# Patient Record
Sex: MALE | Race: OTHER
[De-identification: names, ages, dates, MRNs, and addresses within clinical notes are randomized per-mention and may not be internally consistent; named-entity substitution may affect disease eponyms.]

---

## 2018-01-03 ENCOUNTER — HOSPITAL ENCOUNTER (INPATIENT)
Dept: HOSPITAL 54 - ER | Age: 83
LOS: 6 days | DRG: 871 | End: 2018-01-09
Attending: INTERNAL MEDICINE | Admitting: INTERNAL MEDICINE
Payer: COMMERCIAL

## 2018-01-03 VITALS — DIASTOLIC BLOOD PRESSURE: 109 MMHG | SYSTOLIC BLOOD PRESSURE: 158 MMHG

## 2018-01-03 VITALS — BODY MASS INDEX: 28.63 KG/M2 | WEIGHT: 200 LBS | HEIGHT: 70 IN

## 2018-01-03 DIAGNOSIS — J12.9: ICD-10-CM

## 2018-01-03 DIAGNOSIS — B96.89: ICD-10-CM

## 2018-01-03 DIAGNOSIS — J18.9: ICD-10-CM

## 2018-01-03 DIAGNOSIS — I50.33: ICD-10-CM

## 2018-01-03 DIAGNOSIS — D72.819: ICD-10-CM

## 2018-01-03 DIAGNOSIS — E22.2: ICD-10-CM

## 2018-01-03 DIAGNOSIS — I48.91: ICD-10-CM

## 2018-01-03 DIAGNOSIS — I25.2: ICD-10-CM

## 2018-01-03 DIAGNOSIS — A41.9: Primary | ICD-10-CM

## 2018-01-03 DIAGNOSIS — Z51.5: ICD-10-CM

## 2018-01-03 DIAGNOSIS — R00.1: ICD-10-CM

## 2018-01-03 DIAGNOSIS — I11.0: ICD-10-CM

## 2018-01-03 DIAGNOSIS — R65.20: ICD-10-CM

## 2018-01-03 DIAGNOSIS — I25.10: ICD-10-CM

## 2018-01-03 DIAGNOSIS — J96.01: ICD-10-CM

## 2018-01-03 DIAGNOSIS — Z66: ICD-10-CM

## 2018-01-03 DIAGNOSIS — I21.4: ICD-10-CM

## 2018-01-03 DIAGNOSIS — Z95.5: ICD-10-CM

## 2018-01-03 DIAGNOSIS — Z79.01: ICD-10-CM

## 2018-01-03 LAB
APTT PPP: 30 SEC (ref 23–34)
BASOPHILS # BLD AUTO: 0.2 /CMM (ref 0–0.2)
BASOPHILS NFR BLD AUTO: 2.4 % (ref 0–2)
BUN SERPL-MCNC: 11 MG/DL (ref 7–18)
CALCIUM SERPL-MCNC: 8.7 MG/DL (ref 8.5–10.1)
CHLORIDE SERPL-SCNC: 95 MMOL/L (ref 98–107)
CO2 SERPL-SCNC: 26 MMOL/L (ref 21–32)
CREAT SERPL-MCNC: 0.9 MG/DL (ref 0.6–1.3)
EOSINOPHIL # BLD AUTO: 0.1 /CMM (ref 0–0.7)
EOSINOPHIL NFR BLD AUTO: 1.6 % (ref 0–6)
GLUCOSE SERPL-MCNC: 115 MG/DL (ref 74–106)
HCT VFR BLD AUTO: 42 % (ref 39–51)
HGB BLD-MCNC: 14.3 G/DL (ref 13.5–17.5)
INR PPP: 1.34 (ref 0.87–1.13)
LYMPHOCYTES NFR BLD AUTO: 1.2 /CMM (ref 0.8–4.8)
LYMPHOCYTES NFR BLD AUTO: 18.9 % (ref 20–44)
MCH RBC QN AUTO: 35 PG (ref 26–33)
MCHC RBC AUTO-ENTMCNC: 34 G/DL (ref 31–36)
MCV RBC AUTO: 102 FL (ref 80–96)
MONOCYTES NFR BLD AUTO: 0.5 /CMM (ref 0.1–1.3)
MONOCYTES NFR BLD AUTO: 7.2 % (ref 2–12)
NEUTROPHILS # BLD AUTO: 4.5 /CMM (ref 1.8–8.9)
NEUTROPHILS NFR BLD AUTO: 69.9 % (ref 43–81)
NT-PROBNP SERPL-MCNC: 2751 PG/ML (ref 0–125)
PLATELET # BLD AUTO: 130 /CMM (ref 150–450)
POTASSIUM SERPL-SCNC: 4.7 MMOL/L (ref 3.5–5.1)
RBC # BLD AUTO: 4.07 MIL/UL (ref 4.5–6)
RDW COEFFICIENT OF VARIATION: 13.4 (ref 11.5–15)
SODIUM SERPL-SCNC: 127 MMOL/L (ref 136–145)
TROPONIN I SERPL-MCNC: 0.06 NG/ML (ref 0–0.06)
WBC NRBC COR # BLD AUTO: 6.5 K/UL (ref 4.3–11)

## 2018-01-03 PROCEDURE — A4606 OXYGEN PROBE USED W OXIMETER: HCPCS

## 2018-01-03 PROCEDURE — Z7610: HCPCS

## 2018-01-03 RX ADMIN — NITROGLYCERIN SCH GM: 20 OINTMENT TOPICAL at 23:49

## 2018-01-03 RX ADMIN — FUROSEMIDE SCH MG: 10 INJECTION, SOLUTION INTRAMUSCULAR; INTRAVENOUS at 22:43

## 2018-01-03 RX ADMIN — METOPROLOL TARTRATE SCH MG: 25 TABLET, FILM COATED ORAL at 23:49

## 2018-01-03 NOTE — NUR
[id number]: SOB ON EXERTION.. PATIENT IS AWAKE AND ALERT. APPEARS IN NO DISTRESS. 
SKIN IS WARM TO TOUCH AND NON DIAPHORETIC. AFEBRILE. VSS

## 2018-01-03 NOTE — NUR
RN OPENING NOTES

RECEIVED PATIENT FROM ER IN STABLE CONDITION, ALERT AND ORIENTED X2-3, ENGLISH/Setswana 
SPEAKING. FAMILY PRESENT AT BEDSIDE. NO C/O PAIN AT THIS TIME. TELE MONITOR IS IN PLACE, 
AFIB 58 BPM. PATIENT C/O OF COUGH AND SOB. RESPIRATIONS EVEN AND UNLABORED. BSX4. IV ACCESS 
ON LEFT AC PATENT AND INTACT, FLUSHING WELL WITH NS, NO REDNESS OR INFILTRATION NOTED. 
WAITING FOR MD ORDERS. BED IN LOW AND LOCKED POSITION, SIDE RAILSX2. CALL LIGHT WITHIN EASY 
REACH. WILL CONTINUE TO MONITOR AND ASSESS DURING THE SHIFT.

## 2018-01-03 NOTE — NUR
-------------------------------------------------------------------------------

           *** Note undone in EDM - 01/03/18 at 1914 by DILIA ***            

-------------------------------------------------------------------------------

PT IN BED APPEARS COMFORTABLE. CURRENTLY ON BREATHING TX.  AT BEDSIDE

## 2018-01-04 VITALS — DIASTOLIC BLOOD PRESSURE: 67 MMHG | SYSTOLIC BLOOD PRESSURE: 125 MMHG

## 2018-01-04 VITALS — DIASTOLIC BLOOD PRESSURE: 93 MMHG | SYSTOLIC BLOOD PRESSURE: 136 MMHG

## 2018-01-04 VITALS — SYSTOLIC BLOOD PRESSURE: 146 MMHG | DIASTOLIC BLOOD PRESSURE: 90 MMHG

## 2018-01-04 VITALS — SYSTOLIC BLOOD PRESSURE: 144 MMHG | DIASTOLIC BLOOD PRESSURE: 74 MMHG

## 2018-01-04 VITALS — DIASTOLIC BLOOD PRESSURE: 66 MMHG | SYSTOLIC BLOOD PRESSURE: 126 MMHG

## 2018-01-04 VITALS — SYSTOLIC BLOOD PRESSURE: 136 MMHG | DIASTOLIC BLOOD PRESSURE: 80 MMHG

## 2018-01-04 LAB
BASOPHILS # BLD AUTO: 0 /CMM (ref 0–0.2)
BASOPHILS NFR BLD AUTO: 0.2 % (ref 0–2)
BUN SERPL-MCNC: 14 MG/DL (ref 7–18)
CALCIUM SERPL-MCNC: 8.4 MG/DL (ref 8.5–10.1)
CHLORIDE SERPL-SCNC: 94 MMOL/L (ref 98–107)
CHOLEST SERPL-MCNC: 132 MG/DL (ref ?–200)
CO2 SERPL-SCNC: 25 MMOL/L (ref 21–32)
CREAT SERPL-MCNC: 1 MG/DL (ref 0.6–1.3)
EOSINOPHIL # BLD AUTO: 0 /CMM (ref 0–0.7)
EOSINOPHIL NFR BLD AUTO: 0.8 % (ref 0–6)
EOSINOPHIL NFR BLD MANUAL: 2 % (ref 0–4)
GLUCOSE SERPL-MCNC: 120 MG/DL (ref 74–106)
HCT VFR BLD AUTO: 38 % (ref 39–51)
HDLC SERPL-MCNC: 66 MG/DL (ref 40–60)
HGB BLD-MCNC: 13.1 G/DL (ref 13.5–17.5)
INR PPP: 1.38 (ref 0.87–1.13)
LDLC SERPL DIRECT ASSAY-MCNC: 71 MG/DL (ref 0–99)
LYMPHOCYTES NFR BLD AUTO: 0.9 /CMM (ref 0.8–4.8)
LYMPHOCYTES NFR BLD AUTO: 16.3 % (ref 20–44)
LYMPHOCYTES NFR BLD MANUAL: 12 % (ref 16–48)
MAGNESIUM SERPL-MCNC: 1.6 MG/DL (ref 1.8–2.4)
MCH RBC QN AUTO: 35 PG (ref 26–33)
MCHC RBC AUTO-ENTMCNC: 34 G/DL (ref 31–36)
MCV RBC AUTO: 102 FL (ref 80–96)
MONOCYTES NFR BLD AUTO: 0.4 /CMM (ref 0.1–1.3)
MONOCYTES NFR BLD AUTO: 7.3 % (ref 2–12)
MONOCYTES NFR BLD MANUAL: 4 % (ref 0–11)
NEUTROPHILS # BLD AUTO: 4.1 /CMM (ref 1.8–8.9)
NEUTROPHILS NFR BLD AUTO: 75.4 % (ref 43–81)
NEUTS BAND NFR BLD MANUAL: 1 % (ref 0–5)
NEUTS SEG NFR BLD MANUAL: 81 % (ref 42–76)
PHOSPHATE SERPL-MCNC: 3.1 MG/DL (ref 2.5–4.9)
PLATELET # BLD AUTO: 89 /CMM (ref 150–450)
POTASSIUM SERPL-SCNC: 3.9 MMOL/L (ref 3.5–5.1)
RBC # BLD AUTO: 3.74 MIL/UL (ref 4.5–6)
RDW COEFFICIENT OF VARIATION: 13.6 (ref 11.5–15)
SODIUM SERPL-SCNC: 126 MMOL/L (ref 136–145)
TRIGL SERPL-MCNC: 42 MG/DL (ref 30–150)
WBC NRBC COR # BLD AUTO: 5.4 K/UL (ref 4.3–11)

## 2018-01-04 RX ADMIN — ZOLPIDEM TARTRATE PRN MG: 5 TABLET, FILM COATED ORAL at 22:18

## 2018-01-04 RX ADMIN — METOPROLOL TARTRATE SCH MG: 25 TABLET, FILM COATED ORAL at 08:50

## 2018-01-04 RX ADMIN — FUROSEMIDE SCH MG: 10 INJECTION, SOLUTION INTRAMUSCULAR; INTRAVENOUS at 16:38

## 2018-01-04 RX ADMIN — MAGNESIUM SULFATE IN DEXTROSE SCH MLS/HR: 10 INJECTION, SOLUTION INTRAVENOUS at 11:44

## 2018-01-04 RX ADMIN — WARFARIN SODIUM SCH MG: 5 TABLET ORAL at 17:33

## 2018-01-04 RX ADMIN — FUROSEMIDE SCH MG: 10 INJECTION, SOLUTION INTRAMUSCULAR; INTRAVENOUS at 08:50

## 2018-01-04 RX ADMIN — Medication SCH MG: at 08:51

## 2018-01-04 RX ADMIN — MAGNESIUM SULFATE IN DEXTROSE SCH MLS/HR: 10 INJECTION, SOLUTION INTRAVENOUS at 10:29

## 2018-01-04 RX ADMIN — NITROGLYCERIN SCH GM: 20 OINTMENT TOPICAL at 21:09

## 2018-01-04 RX ADMIN — NITROGLYCERIN SCH GM: 20 OINTMENT TOPICAL at 07:33

## 2018-01-04 RX ADMIN — Medication SCH MG: at 16:38

## 2018-01-04 RX ADMIN — NITROGLYCERIN SCH GM: 20 OINTMENT TOPICAL at 15:19

## 2018-01-04 RX ADMIN — PANTOPRAZOLE SODIUM SCH MG: 40 TABLET, DELAYED RELEASE ORAL at 08:50

## 2018-01-04 NOTE — NUR
TELE RN CLOSING NOTES



PATIENT IN BED, A/O X3. ON TELE MONITOR AFIB HR 59. MAGNESIUM SUPPLEMENTED TODAY. PATIENT IS 
AMBULATORY, FORGETFUL, FREQUENT REMINDERS GIVEN. TOLERATING ROOM AIR, NO SOB. BREATHING EVEN 
AND NON LABORED. CONT HOSPITALIZATION. WILL ENDORSE TO NIGHT SHIFT RN FOR MARTA.

## 2018-01-04 NOTE — NUR
AFIB HR DECREASING FROM 62 TO 38, PATIENT DENIES ANY DISCOMFORT, FAMILY AT THE BEDSIDE. 
INFORMED DR. FIELDS, ORDERED EKG STAT NOTED AND ACKNOWLEDGED.

## 2018-01-04 NOTE — NUR
TELE RN NOTES



PATIENT IN BED, SLEEPING, AROUSES EASILY. AFIB HR 62 ON THE MONITOR, ON 2L OXYGEN VIA NC, 
BREATHING EVEN AND NON LABORED, NO SOB. IVC IN LEFT AC G20 PATENT AND INTACT, FLUSHES WELL. 
BED ALARM ON, SIDE RAILS UP X2. CALL LIGHT WITHIN REACH. WILL CONT TO MONITOR.

## 2018-01-04 NOTE — NUR
PATIENT IS SEEN BY DR. SANCHEZ/CARDIO, MD REVIEWED EKG RESULT AND PATIENTS HOME MEDICATION, 
SPOKE TO THE PATIENT AND FAMILY. WILL CONT TO MONITOR PATIENT.

## 2018-01-04 NOTE — NUR
RN CLOSING NOTES

PATIENT IS SLEEPING IN BED, EASY TO AROUSE, ALERT AND ORIENTED X2-3. NO C/O PAIN AT THIS 
TIME. TELE MONITOR IS IN PLACE, AFIB 67 BPM. RESPIRATIONS EVEN AND UNLABORED.  IV ACCESS ON 
LEFT AC PATENT AND INTACT, FLUSHING WELL WITH NS, NO REDNESS OR INFILTRATION NOTED.ALL NEEDS 
ARE MET AND MEDICATIONS GIVEN PER MD ORDER. BED IN LOW AND LOCKED POSITION, SIDE RAILSX2. 
CALL LIGHT WITHIN EASY REACH. WILL ENDORSE TO RN DAY SHIFT FOR [facility].

## 2018-01-05 VITALS — SYSTOLIC BLOOD PRESSURE: 121 MMHG | DIASTOLIC BLOOD PRESSURE: 70 MMHG

## 2018-01-05 VITALS — SYSTOLIC BLOOD PRESSURE: 145 MMHG | DIASTOLIC BLOOD PRESSURE: 82 MMHG

## 2018-01-05 VITALS — DIASTOLIC BLOOD PRESSURE: 92 MMHG | SYSTOLIC BLOOD PRESSURE: 151 MMHG

## 2018-01-05 VITALS — SYSTOLIC BLOOD PRESSURE: 132 MMHG | DIASTOLIC BLOOD PRESSURE: 75 MMHG

## 2018-01-05 VITALS — SYSTOLIC BLOOD PRESSURE: 178 MMHG | DIASTOLIC BLOOD PRESSURE: 90 MMHG

## 2018-01-05 VITALS — DIASTOLIC BLOOD PRESSURE: 66 MMHG | SYSTOLIC BLOOD PRESSURE: 136 MMHG

## 2018-01-05 VITALS — DIASTOLIC BLOOD PRESSURE: 72 MMHG | SYSTOLIC BLOOD PRESSURE: 139 MMHG

## 2018-01-05 LAB
BASOPHILS # BLD AUTO: 0 /CMM (ref 0–0.2)
BASOPHILS NFR BLD AUTO: 0.5 % (ref 0–2)
BUN SERPL-MCNC: 19 MG/DL (ref 7–18)
CALCIUM SERPL-MCNC: 7.9 MG/DL (ref 8.5–10.1)
CHLORIDE SERPL-SCNC: 91 MMOL/L (ref 98–107)
CO2 SERPL-SCNC: 22 MMOL/L (ref 21–32)
CREAT SERPL-MCNC: 0.9 MG/DL (ref 0.6–1.3)
EOSINOPHIL # BLD AUTO: 0.1 /CMM (ref 0–0.7)
EOSINOPHIL NFR BLD AUTO: 2.1 % (ref 0–6)
EOSINOPHIL NFR BLD MANUAL: 2 % (ref 0–4)
GLUCOSE SERPL-MCNC: 93 MG/DL (ref 74–106)
HCT VFR BLD AUTO: 35 % (ref 39–51)
HGB BLD-MCNC: 12.4 G/DL (ref 13.5–17.5)
INR PPP: 1.6 (ref 0.87–1.13)
LYMPHOCYTES NFR BLD AUTO: 1.1 /CMM (ref 0.8–4.8)
LYMPHOCYTES NFR BLD AUTO: 24.3 % (ref 20–44)
LYMPHOCYTES NFR BLD MANUAL: 19 % (ref 16–48)
MAGNESIUM SERPL-MCNC: 1.7 MG/DL (ref 1.8–2.4)
MCH RBC QN AUTO: 37 PG (ref 26–33)
MCHC RBC AUTO-ENTMCNC: 35 G/DL (ref 31–36)
MCV RBC AUTO: 104 FL (ref 80–96)
MONOCYTES NFR BLD AUTO: 0.4 /CMM (ref 0.1–1.3)
MONOCYTES NFR BLD AUTO: 8.7 % (ref 2–12)
MONOCYTES NFR BLD MANUAL: 11 % (ref 0–11)
NEUTROPHILS # BLD AUTO: 3 /CMM (ref 1.8–8.9)
NEUTROPHILS NFR BLD AUTO: 64.4 % (ref 43–81)
NEUTS SEG NFR BLD MANUAL: 66 % (ref 42–76)
PLATELET # BLD AUTO: 81 /CMM (ref 150–450)
POTASSIUM SERPL-SCNC: 3.7 MMOL/L (ref 3.5–5.1)
RBC # BLD AUTO: 3.39 MIL/UL (ref 4.5–6)
RDW COEFFICIENT OF VARIATION: 13.6 (ref 11.5–15)
SODIUM SERPL-SCNC: 123 MMOL/L (ref 136–145)
VARIANT LYMPHS NFR BLD MANUAL: 2 % (ref 0–0)
WBC NRBC COR # BLD AUTO: 4.6 K/UL (ref 4.3–11)

## 2018-01-05 RX ADMIN — PANTOPRAZOLE SODIUM SCH MG: 40 TABLET, DELAYED RELEASE ORAL at 08:18

## 2018-01-05 RX ADMIN — ZOLPIDEM TARTRATE PRN MG: 5 TABLET, FILM COATED ORAL at 20:52

## 2018-01-05 RX ADMIN — NITROGLYCERIN SCH GM: 20 OINTMENT TOPICAL at 05:44

## 2018-01-05 RX ADMIN — Medication PRN MG: at 20:37

## 2018-01-05 RX ADMIN — MAGNESIUM SULFATE IN DEXTROSE SCH MLS/HR: 10 INJECTION, SOLUTION INTRAVENOUS at 11:49

## 2018-01-05 RX ADMIN — MAGNESIUM SULFATE IN DEXTROSE SCH MLS/HR: 10 INJECTION, SOLUTION INTRAVENOUS at 10:43

## 2018-01-05 RX ADMIN — ALBUTEROL SULFATE PRN MG: 2.5 SOLUTION RESPIRATORY (INHALATION) at 20:37

## 2018-01-05 RX ADMIN — NITROGLYCERIN SCH GM: 20 OINTMENT TOPICAL at 12:20

## 2018-01-05 RX ADMIN — Medication SCH MG: at 08:18

## 2018-01-05 RX ADMIN — NITROGLYCERIN SCH GM: 20 OINTMENT TOPICAL at 20:51

## 2018-01-05 RX ADMIN — FUROSEMIDE SCH MG: 10 INJECTION, SOLUTION INTRAMUSCULAR; INTRAVENOUS at 08:18

## 2018-01-05 RX ADMIN — Medication SCH MG: at 17:14

## 2018-01-05 RX ADMIN — WARFARIN SODIUM SCH MG: 5 TABLET ORAL at 17:15

## 2018-01-05 RX ADMIN — FUROSEMIDE SCH MG: 10 INJECTION, SOLUTION INTRAMUSCULAR; INTRAVENOUS at 17:14

## 2018-01-05 NOTE — NUR
TELE RN: INITIAL NOTE

RECEIVED PT A/OX2-3. ON TELE MONITORING. PACING A.FIB IN 62BPM. NO DISTRESS NOTED. NO SOB 
NOTED. NO PAIN NOTED. CONTINENT AND BRP. AMBULATORY WITH ASSIST. SKIN INTACT. HL ON L AC#20. 
SITE CLEAR AND PATENT. NO IV FLUIDS RUNNING. RESTING COMFORTABLY IN BED. CALL LIGHT WITHIN 
REACH.

## 2018-01-05 NOTE — NUR
TELE RN NOTES



AWAKE & RESPONSIVE. NOT IN ANY DISTRESS. NO SOB NOTED. DENIES ANY PAIN OR DISCOMFORT AT THIS 
TIME. PT REFUSING TELE MONITOR. WITH IV-HL PATENT & INTACT. MONITORED ACCORDINGLY. CALL 
LIGHT WITHIN REACH. BED IN LOWEST POSITION. SR UP X 2 WITH BED ALARM ON FOR SAFETY. WILL 
ENDORSE TO NEXT SHIFT.

## 2018-01-05 NOTE — NUR
TELE RN: CLOSING NOTE

PT TOOK ALL MEDICATIONS ON TIME. NO ADVERSE REACTIONS NOTED. NO SOB NOTED. NO PAIN NOTED. ON 
TELE MONITORING A.FIB AT 66 BPM. AMBULATORY. SKIN INTACT. HL #20 ON L AC WITH NO IV FLUIDS 
RUNNING. SITE CLEAR AND PATENT. RESTING COMFORTABLY IN BED. CALL LIGHT WITHIN REACH.

## 2018-01-06 VITALS — DIASTOLIC BLOOD PRESSURE: 77 MMHG | SYSTOLIC BLOOD PRESSURE: 156 MMHG

## 2018-01-06 VITALS — SYSTOLIC BLOOD PRESSURE: 104 MMHG | DIASTOLIC BLOOD PRESSURE: 62 MMHG

## 2018-01-06 VITALS — SYSTOLIC BLOOD PRESSURE: 141 MMHG | DIASTOLIC BLOOD PRESSURE: 74 MMHG

## 2018-01-06 VITALS — DIASTOLIC BLOOD PRESSURE: 70 MMHG | SYSTOLIC BLOOD PRESSURE: 128 MMHG

## 2018-01-06 VITALS — DIASTOLIC BLOOD PRESSURE: 115 MMHG | SYSTOLIC BLOOD PRESSURE: 116 MMHG

## 2018-01-06 LAB
BASOPHILS # BLD AUTO: 0 /CMM (ref 0–0.2)
BASOPHILS NFR BLD AUTO: 0 % (ref 0–2)
BUN SERPL-MCNC: 18 MG/DL (ref 7–18)
CALCIUM SERPL-MCNC: 8 MG/DL (ref 8.5–10.1)
CHLORIDE SERPL-SCNC: 86 MMOL/L (ref 98–107)
CO2 SERPL-SCNC: 27 MMOL/L (ref 21–32)
CREAT SERPL-MCNC: 1.1 MG/DL (ref 0.6–1.3)
EOSINOPHIL # BLD AUTO: 0 /CMM (ref 0–0.7)
EOSINOPHIL NFR BLD AUTO: 0 % (ref 0–6)
GLUCOSE SERPL-MCNC: 98 MG/DL (ref 74–106)
HCT VFR BLD AUTO: 36 % (ref 39–51)
HGB BLD-MCNC: 12.6 G/DL (ref 13.5–17.5)
INR PPP: 2.86 (ref 0.87–1.13)
LYMPHOCYTES NFR BLD AUTO: 0.2 /CMM (ref 0.8–4.8)
LYMPHOCYTES NFR BLD AUTO: 4.9 % (ref 20–44)
LYMPHOCYTES NFR BLD MANUAL: 6 % (ref 16–48)
MCH RBC QN AUTO: 36 PG (ref 26–33)
MCHC RBC AUTO-ENTMCNC: 35 G/DL (ref 31–36)
MCV RBC AUTO: 104 FL (ref 80–96)
MONOCYTES NFR BLD AUTO: 0.1 /CMM (ref 0.1–1.3)
MONOCYTES NFR BLD AUTO: 2.1 % (ref 2–12)
MONOCYTES NFR BLD MANUAL: 3 % (ref 0–11)
NEUTROPHILS # BLD AUTO: 3.4 /CMM (ref 1.8–8.9)
NEUTROPHILS NFR BLD AUTO: 93 % (ref 43–81)
NEUTS BAND NFR BLD MANUAL: 18 % (ref 0–5)
NEUTS SEG NFR BLD MANUAL: 73 % (ref 42–76)
PLATELET # BLD AUTO: 96 /CMM (ref 150–450)
POTASSIUM SERPL-SCNC: 3.3 MMOL/L (ref 3.5–5.1)
RBC # BLD AUTO: 3.5 MIL/UL (ref 4.5–6)
RDW COEFFICIENT OF VARIATION: 13.6 (ref 11.5–15)
SODIUM SERPL-SCNC: 122 MMOL/L (ref 136–145)
WBC NRBC COR # BLD AUTO: 3.7 K/UL (ref 4.3–11)

## 2018-01-06 RX ADMIN — ALBUTEROL SULFATE PRN MG: 2.5 SOLUTION RESPIRATORY (INHALATION) at 11:28

## 2018-01-06 RX ADMIN — Medication PRN MG: at 11:28

## 2018-01-06 RX ADMIN — Medication SCH MG: at 16:36

## 2018-01-06 RX ADMIN — FUROSEMIDE SCH MG: 10 INJECTION, SOLUTION INTRAMUSCULAR; INTRAVENOUS at 08:52

## 2018-01-06 RX ADMIN — NITROGLYCERIN SCH GM: 20 OINTMENT TOPICAL at 21:35

## 2018-01-06 RX ADMIN — Medication SCH MG: at 08:51

## 2018-01-06 RX ADMIN — PANTOPRAZOLE SODIUM SCH MG: 40 TABLET, DELAYED RELEASE ORAL at 08:51

## 2018-01-06 RX ADMIN — NITROGLYCERIN SCH GM: 20 OINTMENT TOPICAL at 05:39

## 2018-01-06 RX ADMIN — NITROGLYCERIN SCH GM: 20 OINTMENT TOPICAL at 12:07

## 2018-01-06 NOTE — NUR
MS/RN OPENING NOTES

PATIENT IN BED, RESTING COMFORTABLY IN BED, SPOUSE WAS AT BED SIDE AND REPORTED REGARING 
DISCOLORATION OF ABDOMEN ,, AM RN WAS MADE AWARE ASWELL, AND PHOTO WAS TAKEN, WILL MONITOR, 
PER WIFE NOT TO GIVE ANY MEDICATION BY MOUTH AND MONITOR AS PATIENT ATTEMPTS TO GET OUT OF 
BED FOR SAFETY, ALERTX2, ABLE TO FOLLOW SIMPLE COMMANDS, SKIN WARM TO TOUCH, BED ALARM ON 
WILL CONTINUE TO MONITOR.

## 2018-01-06 NOTE — NUR
MS/RN NOTES



RECEIVED PATIENT RESTING IN BED, SLEEPING AROUSES TO VERBAL AND TACTILE STIMULI. IN NO 
APPARENT DISTRESS, NO S/S OF PAIN OR DISCOMFORT , APPEARS CALM AND COMFORTABLE. RESPIRATIONS 
EVEN AND UNLABORED, ON 02 VIA NC AT 2LPM SAT ABOVE 95%. IV TO LAC G20 H/L. PATIENT AT 
INCREASED RISK OF FALL/INJURY, SAFETY MEASURES RENDERED , BED LOCKED IN LOWEST POSITION, 
RAILS UPX2, CALL LIGHT PLACED WITHIN EASY REACH. WILL CONTINUE TO MONITOR.

## 2018-01-06 NOTE — NUR
DURGA/RN NOTES



PATIENT IS NOTED WITH PURPLE DISCOLORATION, POSSIBLY HEMATOMA TO THE ABDOMEN. PER WIFE 
PATIENT DID NOT HAVE THE DISCOLORATION UPON ADMISSION. PICTURES TAKEN AND PLACED IN CHART. 
WITNESS DURING END OF SHIFT. .

## 2018-01-06 NOTE — NUR
MS/RN NOTES



PATIENT RESTING IN BED COMFORTABLY WITH FAMILY AT BEDSIDE. NO S.S OF PAIN OR DISCOMFORT 
NOTED. PATIENT STABLE WITH NO SIGNIFICANT CHANGES. ALL DUE MEDICATIONS GIVEN DUE NEEDS MET 
AND ATTENDED,  PATIENT KEPT CLEAN AND COMFORTABLE. SAFETY MEASURES RENDERED, FALL/INJURY 
PRECAUTIONS RENDERED. WILL ENDORSE CARE TO NIGHT SHIFT FOR M[facility]A.

## 2018-01-06 NOTE — NUR
TELE RN NOTES



AWAKE & RESPONSIVE. NOT IN ANY DISTRESS. NO SOB NOTED. DENIES ANY PAIN OR DISCOMFORT AT THIS 
TIME. WITH IV-HL PATENT & INTACT. MONITORED ACCORDINGLY. CALL LIGHT WITHIN REACH. BED IN 
LOWEST POSITION. SR UP X 2 WITH BED ALARM ON FOR SAFETY. WILL ENDORSE TO NEXT SHIFT.

## 2018-01-06 NOTE — NUR
MS/RN NOTES



PATIENT IN DEEP SLEEP POSSIBLY DUE TO SLEEPING PILLS GIVEN LAST NIGHT. WIFE AT BEDSIDE. 
VITAL SIGNS STABLE, PATIENT IN NO DISTRESS. RECEIVES BREATHING TREATMENT PRN DUE TO WHEEZING 
AND CRACKLES. ON 02 VIA NC AT 2LPM O2 SAT 96% AT THIS TIME

## 2018-01-07 VITALS — SYSTOLIC BLOOD PRESSURE: 175 MMHG | DIASTOLIC BLOOD PRESSURE: 83 MMHG

## 2018-01-07 VITALS — SYSTOLIC BLOOD PRESSURE: 160 MMHG | DIASTOLIC BLOOD PRESSURE: 101 MMHG

## 2018-01-07 VITALS — DIASTOLIC BLOOD PRESSURE: 99 MMHG | SYSTOLIC BLOOD PRESSURE: 151 MMHG

## 2018-01-07 LAB
BUN SERPL-MCNC: 24 MG/DL (ref 7–18)
CALCIUM SERPL-MCNC: 8.4 MG/DL (ref 8.5–10.1)
CHLORIDE SERPL-SCNC: 80 MMOL/L (ref 98–107)
CO2 SERPL-SCNC: 22 MMOL/L (ref 21–32)
CREAT SERPL-MCNC: 1.1 MG/DL (ref 0.6–1.3)
GLUCOSE SERPL-MCNC: 96 MG/DL (ref 74–106)
INR PPP: 3.46 (ref 0.87–1.13)
POTASSIUM SERPL-SCNC: 4.5 MMOL/L (ref 3.5–5.1)
SODIUM SERPL-SCNC: 113 MMOL/L (ref 136–145)

## 2018-01-07 RX ADMIN — WARFARIN SODIUM SCH MG: 2 TABLET ORAL at 17:00

## 2018-01-07 RX ADMIN — Medication SCH MG: at 23:28

## 2018-01-07 RX ADMIN — ALBUTEROL SULFATE PRN MG: 2.5 SOLUTION RESPIRATORY (INHALATION) at 16:11

## 2018-01-07 RX ADMIN — Medication PRN MG: at 16:11

## 2018-01-07 RX ADMIN — Medication PRN MG: at 11:36

## 2018-01-07 RX ADMIN — ALBUTEROL SULFATE PRN MG: 2.5 SOLUTION RESPIRATORY (INHALATION) at 11:37

## 2018-01-07 RX ADMIN — NITROGLYCERIN SCH GM: 20 OINTMENT TOPICAL at 06:48

## 2018-01-07 RX ADMIN — Medication SCH MG: at 08:01

## 2018-01-07 RX ADMIN — Medication SCH MG: at 17:00

## 2018-01-07 RX ADMIN — NITROGLYCERIN SCH GM: 20 OINTMENT TOPICAL at 14:57

## 2018-01-07 RX ADMIN — Medication PRN MG: at 22:40

## 2018-01-07 RX ADMIN — ALBUTEROL SULFATE PRN MG: 2.5 SOLUTION RESPIRATORY (INHALATION) at 22:40

## 2018-01-07 RX ADMIN — PANTOPRAZOLE SODIUM SCH MG: 40 TABLET, DELAYED RELEASE ORAL at 07:30

## 2018-01-07 RX ADMIN — NITROGLYCERIN SCH GM: 20 OINTMENT TOPICAL at 21:53

## 2018-01-07 NOTE — NUR
RN NOTES



NOTIFIED DR. MARTINEZ. PATIENT PRESENTS SOB WITH SATURATIONS AT 94% ON 3LPM O2 VI NC, RR 
30  AND CRACKLES UP AUSCULTATION, AFTER PATIENT WAS GIVEN BREATHING TREATMENT. NEW 
ORDER GIVEN FOR LASIX 40MG IV ONCE. WILL CARRYOUT ORDERS AND CONTINUE TO MONITOR.

## 2018-01-07 NOTE — NUR
RN OPEN NOTES



RECEIVED PATIENT AWAKE SITTING ON BED WITH FAMILY AT BEDSIDE. NO SIGNS OF DISTRESS OR 
DISCOMFORT. BREATHING EVEN AND UNLABORED. ON 3LPM O2 VIA NC. PATIENT NOTED TO HAVE SOB WITH 
EXERTION. IV ACCESS IN LAC, PATENT AND INTACT, NO SIGNS OF REDNESS OR INFILTRATION. BED IN 
LOW LOCKED POSITION WITH SIDE RAILS X3. CALL LIGHT WITHIN REACH. WILL CONTINUE TO MONITOR.

## 2018-01-07 NOTE — NUR
MS/RN NOTES



PATIENT IN BED CONGESTED, DYSPNEA AT REST, RECIEVED BREATHING TX PRN. DR AT BEDSIDE 
DISCUSSING PLAN OF CARE WITH FAMILY

## 2018-01-07 NOTE — NUR
MS/RN NOTES



1700 COUMADIN HELD DUE TO INR LEVEL OF 3.46. PATIENT THERAPEUTIC AT THIS TIME. HAS SEVERE 
HEMATOMA TO THE ABDOMEN. FAMILY INSISTING MEDICATION TO NOT BE GIVEN. MD NOTIFIED

## 2018-01-07 NOTE — NUR
MS/RN NOTES



RECEIVED PATIENT RESTING IN BED IN NO APPARENT DISTRESS, NO S/S OF PAIN OR DISCOMFORT,  SOB 
NOTED WITH MINIMAL EXERTION , APPEARS CONGESTED, CRACKLES UPON AUSCULTATION, ON 02 VIA NC AT 
2LPM SAT ABOVE 95%, RECEIVES BREATHING TREATMENT PRN. IV TO LAC G20 H/L. PATIENT AT 
INCREASED RISK OF FALL/INJURY, SAFETY MEASURES RENDERED , BED LOCKED IN LOWEST POSITION, 
ALARM SET, RAILS UPX2, CALL LIGHT PLACED WITHIN EASY REACH. WILL CONTINUE TO MONITOR.

## 2018-01-07 NOTE — NUR
MS/RN CLOSING NOTES

PATIENT IN BED, ASSISTED AND KEEP SAFE, REORIENT TO TIME AND COOPERATIVE TO CARE, BLOOD 
PRESSURE CHECK AND ADMINISTER REQUIRE NITRO BID. RESPIRATION EVEN AND UNLABORED WITH NOTED 
CRACKLES, BED IN LOCK POSITION, CALL LIGHTS WITHIN REACH, SKIN WARM TO TOUCH.WILL CONTINUE 
TO MONITOR.

## 2018-01-07 NOTE — NUR
MS/RN NOTES



PATIENT RESTING IN BED WITH FAMILY AT BEDSIDE. PATIENT IN MODERATE DISTRESS DUE TO 
CONGESTION, SOB NOTED HOWEVER SAT ABOVE 90% ON ROOM AIR. ALL DUE MEDICATIONS GIVEN ALL NEEDS 
MET AND ATTENDED. SAFETY MEASURES RENDERED. 1L FLUID RESTRICTION.

## 2018-01-08 VITALS — SYSTOLIC BLOOD PRESSURE: 87 MMHG | DIASTOLIC BLOOD PRESSURE: 60 MMHG

## 2018-01-08 VITALS — DIASTOLIC BLOOD PRESSURE: 49 MMHG | SYSTOLIC BLOOD PRESSURE: 94 MMHG

## 2018-01-08 VITALS — SYSTOLIC BLOOD PRESSURE: 86 MMHG | DIASTOLIC BLOOD PRESSURE: 47 MMHG

## 2018-01-08 VITALS — SYSTOLIC BLOOD PRESSURE: 117 MMHG | DIASTOLIC BLOOD PRESSURE: 63 MMHG

## 2018-01-08 VITALS — SYSTOLIC BLOOD PRESSURE: 114 MMHG | DIASTOLIC BLOOD PRESSURE: 57 MMHG

## 2018-01-08 VITALS — SYSTOLIC BLOOD PRESSURE: 124 MMHG | DIASTOLIC BLOOD PRESSURE: 59 MMHG

## 2018-01-08 VITALS — DIASTOLIC BLOOD PRESSURE: 55 MMHG | SYSTOLIC BLOOD PRESSURE: 82 MMHG

## 2018-01-08 VITALS — DIASTOLIC BLOOD PRESSURE: 45 MMHG | SYSTOLIC BLOOD PRESSURE: 75 MMHG

## 2018-01-08 VITALS — DIASTOLIC BLOOD PRESSURE: 55 MMHG | SYSTOLIC BLOOD PRESSURE: 85 MMHG

## 2018-01-08 VITALS — DIASTOLIC BLOOD PRESSURE: 65 MMHG | SYSTOLIC BLOOD PRESSURE: 124 MMHG

## 2018-01-08 VITALS — SYSTOLIC BLOOD PRESSURE: 84 MMHG | DIASTOLIC BLOOD PRESSURE: 45 MMHG

## 2018-01-08 VITALS — SYSTOLIC BLOOD PRESSURE: 129 MMHG | DIASTOLIC BLOOD PRESSURE: 52 MMHG

## 2018-01-08 VITALS — DIASTOLIC BLOOD PRESSURE: 58 MMHG | SYSTOLIC BLOOD PRESSURE: 101 MMHG

## 2018-01-08 VITALS — SYSTOLIC BLOOD PRESSURE: 88 MMHG | DIASTOLIC BLOOD PRESSURE: 50 MMHG

## 2018-01-08 VITALS — SYSTOLIC BLOOD PRESSURE: 163 MMHG | DIASTOLIC BLOOD PRESSURE: 102 MMHG

## 2018-01-08 VITALS — DIASTOLIC BLOOD PRESSURE: 51 MMHG | SYSTOLIC BLOOD PRESSURE: 90 MMHG

## 2018-01-08 VITALS — DIASTOLIC BLOOD PRESSURE: 50 MMHG | SYSTOLIC BLOOD PRESSURE: 85 MMHG

## 2018-01-08 VITALS — DIASTOLIC BLOOD PRESSURE: 42 MMHG | SYSTOLIC BLOOD PRESSURE: 99 MMHG

## 2018-01-08 VITALS — SYSTOLIC BLOOD PRESSURE: 122 MMHG | DIASTOLIC BLOOD PRESSURE: 57 MMHG

## 2018-01-08 VITALS — SYSTOLIC BLOOD PRESSURE: 91 MMHG | DIASTOLIC BLOOD PRESSURE: 65 MMHG

## 2018-01-08 VITALS — SYSTOLIC BLOOD PRESSURE: 160 MMHG | DIASTOLIC BLOOD PRESSURE: 101 MMHG

## 2018-01-08 VITALS — SYSTOLIC BLOOD PRESSURE: 86 MMHG | DIASTOLIC BLOOD PRESSURE: 52 MMHG

## 2018-01-08 VITALS — SYSTOLIC BLOOD PRESSURE: 101 MMHG | DIASTOLIC BLOOD PRESSURE: 58 MMHG

## 2018-01-08 VITALS — SYSTOLIC BLOOD PRESSURE: 106 MMHG | DIASTOLIC BLOOD PRESSURE: 68 MMHG

## 2018-01-08 VITALS — SYSTOLIC BLOOD PRESSURE: 89 MMHG | DIASTOLIC BLOOD PRESSURE: 44 MMHG

## 2018-01-08 VITALS — SYSTOLIC BLOOD PRESSURE: 91 MMHG | DIASTOLIC BLOOD PRESSURE: 37 MMHG

## 2018-01-08 VITALS — SYSTOLIC BLOOD PRESSURE: 91 MMHG | DIASTOLIC BLOOD PRESSURE: 43 MMHG

## 2018-01-08 VITALS — SYSTOLIC BLOOD PRESSURE: 123 MMHG | DIASTOLIC BLOOD PRESSURE: 60 MMHG

## 2018-01-08 VITALS — SYSTOLIC BLOOD PRESSURE: 108 MMHG | DIASTOLIC BLOOD PRESSURE: 58 MMHG

## 2018-01-08 VITALS — DIASTOLIC BLOOD PRESSURE: 67 MMHG | SYSTOLIC BLOOD PRESSURE: 121 MMHG

## 2018-01-08 VITALS — DIASTOLIC BLOOD PRESSURE: 5 MMHG | SYSTOLIC BLOOD PRESSURE: 86 MMHG

## 2018-01-08 VITALS — DIASTOLIC BLOOD PRESSURE: 47 MMHG | SYSTOLIC BLOOD PRESSURE: 130 MMHG

## 2018-01-08 VITALS — DIASTOLIC BLOOD PRESSURE: 49 MMHG | SYSTOLIC BLOOD PRESSURE: 81 MMHG

## 2018-01-08 VITALS — SYSTOLIC BLOOD PRESSURE: 121 MMHG | DIASTOLIC BLOOD PRESSURE: 59 MMHG

## 2018-01-08 VITALS — SYSTOLIC BLOOD PRESSURE: 114 MMHG | DIASTOLIC BLOOD PRESSURE: 63 MMHG

## 2018-01-08 VITALS — SYSTOLIC BLOOD PRESSURE: 108 MMHG | DIASTOLIC BLOOD PRESSURE: 47 MMHG

## 2018-01-08 VITALS — SYSTOLIC BLOOD PRESSURE: 125 MMHG | DIASTOLIC BLOOD PRESSURE: 57 MMHG

## 2018-01-08 VITALS — SYSTOLIC BLOOD PRESSURE: 145 MMHG | DIASTOLIC BLOOD PRESSURE: 73 MMHG

## 2018-01-08 VITALS — SYSTOLIC BLOOD PRESSURE: 91 MMHG | DIASTOLIC BLOOD PRESSURE: 49 MMHG

## 2018-01-08 VITALS — DIASTOLIC BLOOD PRESSURE: 76 MMHG | SYSTOLIC BLOOD PRESSURE: 116 MMHG

## 2018-01-08 VITALS — SYSTOLIC BLOOD PRESSURE: 95 MMHG | DIASTOLIC BLOOD PRESSURE: 49 MMHG

## 2018-01-08 VITALS — DIASTOLIC BLOOD PRESSURE: 48 MMHG | SYSTOLIC BLOOD PRESSURE: 90 MMHG

## 2018-01-08 VITALS — SYSTOLIC BLOOD PRESSURE: 79 MMHG | DIASTOLIC BLOOD PRESSURE: 51 MMHG

## 2018-01-08 VITALS — DIASTOLIC BLOOD PRESSURE: 53 MMHG | SYSTOLIC BLOOD PRESSURE: 132 MMHG

## 2018-01-08 VITALS — DIASTOLIC BLOOD PRESSURE: 60 MMHG | SYSTOLIC BLOOD PRESSURE: 126 MMHG

## 2018-01-08 VITALS — SYSTOLIC BLOOD PRESSURE: 111 MMHG | DIASTOLIC BLOOD PRESSURE: 59 MMHG

## 2018-01-08 VITALS — DIASTOLIC BLOOD PRESSURE: 46 MMHG | SYSTOLIC BLOOD PRESSURE: 80 MMHG

## 2018-01-08 VITALS — SYSTOLIC BLOOD PRESSURE: 102 MMHG | DIASTOLIC BLOOD PRESSURE: 55 MMHG

## 2018-01-08 VITALS — DIASTOLIC BLOOD PRESSURE: 63 MMHG | SYSTOLIC BLOOD PRESSURE: 117 MMHG

## 2018-01-08 LAB
BASE EXCESS BLDA CALC-SCNC: 1.5 MMOL/L
BASOPHILS # BLD AUTO: 0 /CMM (ref 0–0.2)
BASOPHILS NFR BLD AUTO: 0 % (ref 0–2)
BUN SERPL-MCNC: 25 MG/DL (ref 7–18)
CALCIUM SERPL-MCNC: 8 MG/DL (ref 8.5–10.1)
CHLORIDE SERPL-SCNC: 83 MMOL/L (ref 98–107)
CO2 SERPL-SCNC: 25 MMOL/L (ref 21–32)
CREAT SERPL-MCNC: 1.2 MG/DL (ref 0.6–1.3)
CREAT UR-MCNC: 131.2 MG/DL (ref 30–125)
DO-HGB MFR BLDA: 411.8 MMHG
EOSINOPHIL # BLD AUTO: 0 /CMM (ref 0–0.7)
EOSINOPHIL NFR BLD AUTO: 0.2 % (ref 0–6)
GLUCOSE SERPL-MCNC: 78 MG/DL (ref 74–106)
HCT VFR BLD AUTO: 37 % (ref 39–51)
HGB BLD-MCNC: 13.2 G/DL (ref 13.5–17.5)
INHALED O2 CONCENTRATION: 100 %
INR PPP: 2.69 (ref 0.87–1.13)
INTRINSIC PEEP RESPIRATORY: 5 CM H2O
LYMPHOCYTES NFR BLD AUTO: 0.1 /CMM (ref 0.8–4.8)
LYMPHOCYTES NFR BLD AUTO: 10.3 % (ref 20–44)
MCH RBC QN AUTO: 36 PG (ref 26–33)
MCHC RBC AUTO-ENTMCNC: 36 G/DL (ref 31–36)
MCV RBC AUTO: 101 FL (ref 80–96)
MONOCYTES NFR BLD AUTO: 0.1 /CMM (ref 0.1–1.3)
MONOCYTES NFR BLD AUTO: 6.2 % (ref 2–12)
NEUTROPHILS # BLD AUTO: 0.9 /CMM (ref 1.8–8.9)
NEUTROPHILS NFR BLD AUTO: 83.3 % (ref 43–81)
OSMOLALITY UR: 319 MOS/KG (ref 340–1090)
PCO2 TEMP ADJ BLDA: 31.9 MMHG (ref 35–45)
PEEP SETTING VENT: 550 ML
PH TEMP ADJ BLDA: 7.5 [PH] (ref 7.35–7.45)
PLATELET # BLD AUTO: 90 /CMM (ref 150–450)
PO2 TEMP ADJ BLDA: 269.3 MMHG (ref 75–100)
POTASSIUM SERPL-SCNC: 4.2 MMOL/L (ref 3.5–5.1)
RBC # BLD AUTO: 3.69 MIL/UL (ref 4.5–6)
RDW COEFFICIENT OF VARIATION: 14 (ref 11.5–15)
SAO2 % BLDA: 99.3 % (ref 92–98.5)
SET RATE, BG: 18
SODIUM SERPL-SCNC: 117 MMOL/L (ref 136–145)
VENTILATION MODE VENT: (no result)
WBC NRBC COR # BLD AUTO: 1 K/UL (ref 4.3–11)

## 2018-01-08 PROCEDURE — 0BH17EZ INSERTION OF ENDOTRACHEAL AIRWAY INTO TRACHEA, VIA NATURAL OR ARTIFICIAL OPENING: ICD-10-PCS | Performed by: INTERNAL MEDICINE

## 2018-01-08 PROCEDURE — 5A1935Z RESPIRATORY VENTILATION, LESS THAN 24 CONSECUTIVE HOURS: ICD-10-PCS | Performed by: INTERNAL MEDICINE

## 2018-01-08 RX ADMIN — DEXTROSE MONOHYDRATE PRN MLS/HR: 50 INJECTION, SOLUTION INTRAVENOUS at 18:55

## 2018-01-08 RX ADMIN — Medication SCH MG: at 13:44

## 2018-01-08 RX ADMIN — WARFARIN SODIUM SCH MG: 2 TABLET ORAL at 19:02

## 2018-01-08 RX ADMIN — Medication SCH MG: at 09:00

## 2018-01-08 RX ADMIN — NITROGLYCERIN SCH GM: 20 OINTMENT TOPICAL at 05:44

## 2018-01-08 RX ADMIN — PROPOFOL PRN MLS/HR: 10 INJECTION, EMULSION INTRAVENOUS at 12:03

## 2018-01-08 RX ADMIN — PROPOFOL PRN MLS/HR: 10 INJECTION, EMULSION INTRAVENOUS at 16:46

## 2018-01-08 RX ADMIN — ACETAMINOPHEN PRN MG: 325 TABLET ORAL at 08:35

## 2018-01-08 RX ADMIN — Medication SCH MG: at 10:29

## 2018-01-08 RX ADMIN — PANTOPRAZOLE SODIUM SCH MG: 40 TABLET, DELAYED RELEASE ORAL at 07:55

## 2018-01-08 RX ADMIN — ACETAMINOPHEN PRN MG: 325 TABLET ORAL at 14:42

## 2018-01-08 RX ADMIN — Medication SCH MG: at 22:40

## 2018-01-08 RX ADMIN — Medication SCH MG: at 18:59

## 2018-01-08 RX ADMIN — PROPOFOL PRN MLS/HR: 10 INJECTION, EMULSION INTRAVENOUS at 04:46

## 2018-01-08 RX ADMIN — Medication SCH MG: at 17:00

## 2018-01-08 NOTE — NUR
ICU RN

 PT WAS  TRANSFERRED FROM [facility] WITH DIAGNOSIS SEVERE RESPIRATORY FAILURE, PULMONARY EDEMA, 
CHF, COPD. . STAT INTUBATION DONE. VENT. AC MODE.TOLERATE WELL. SECOND IV INSERTED ON LEFT 
INTERNAL JUGULAR VEIN. F/C WAS INSERTED AS WELL. PT WAS AGITATED. SOFT WRIST RESTRAINTS ON. 
STARTED PROPOFOL DRIP. REPORT GIVEN TO GUANAKITO GROVES RN.

## 2018-01-08 NOTE — NUR
RN ICU

RECEIVED PT SEDATED WITH DIPRIVAN.  ETT INTACT CONNECTED TO VENT.  PT ABLE TO ASSIST VENT.  
IV SITES INTACT.  NEW IV SITE STARTED IN LEFT ARM.

## 2018-01-08 NOTE — NUR
RN CLOSING NOTES



TRANSFERRED PATIENT TO ICU ROOM 258. PATIENT PRESENTED WITH LABORED BREATHING AND CRACKLES 
UPON AUSCULTATION. PATIENT WAS SUCTIONED AND DESPITE SUCTIONING PATIENT STILL HAVING LABORED 
BREATHING WITH SATURATION 88-90 AND ELEVATED   AND RR 40 ON SIMPLE MASK AT 7LPM, 
PATIENT APPEARS DIAPHORETIC AND LETHARGIC. NOTIFIED DR. MARTINEZ OF CHANGE IN PATIENTS 
CONDITION. ORDER GIVEN TO TRANSFER TO ICU AND INITIATE BIPAP. TRANSFERRED PATIENT VIA ACLS 
PROTOCOL. ALL BELONGINGS ON BED WITH PATIENT. L/M FOR DAUGHTER KEITH [phone] AND 
NOTIFIED OF TRANSFER.

## 2018-01-08 NOTE — NUR
RN NOTES



DR. MARTINEZ MADE AWARE OF ABDOMINAL BRUISING/DISCOLORATION. NO NEW ORDERS GIVEN. WILL 
CONTINUE TO MONITOR.

## 2018-01-08 NOTE — NUR
ICU RN. INITIAL ASSESSMENT. RECEIBED THE PT REST ON THE BED. ORALLY INTUBATED. SEDATED WITH 
DIPRIVAN.ETT #8,LIP 24CM,AC 14,,FIO2 100%,PEEP 5. SAT 90%. CARDIAC MONITOR SHOWING A 
FIB. IV LT AC 20G,LT EJ 18G,LT FA 18G. DIPRIVAN 10MCG/KG/MIN,LEVOAT THIS TIME 20MCG/KG/MIN. 
HOB ELEVATED. TURN AND REPOSITION Q2H. FC PATENT. OGT INTACT. NPOBIL SOFT WRIST RESTRAINT 
CHECKED AND RELEASED. NO INJURY OR REDNESS NOTED. WILL CONTINUE TO MONITOR VITALS.

## 2018-01-08 NOTE — NUR
RT END OF THE SHIFT REPORT,



PT. 82 Y OLD MALE, REMAIN ORALLY INTUBATED ETT # 8.0 AT 24 CM LIPLINE 

VENT SETTINGS ARE AS NOTED AND CHANGES MADE PER DR. KELLY ORDER , ALARMS ARE SET AND 
FUNCTIONAL. 

VENT INTO RED OUT LET AND, AMBU BAG AT THE BEDSIDE. EQUAL CHEST MOVEMENT B/S NOTED AND PANDYA'X 
FOR MINIMAL HME CHANGED AND CONTINUE OF CARE.



REPORT WILL BE PASS TO PM SHIFT 

-------------------------------------------------------------------------------

Addendum: 01/08/18 at 1842 by NEAL LOU RT

-------------------------------------------------------------------------------

Amended: Links added.

## 2018-01-08 NOTE — NUR
RN ICU

OGT PLACED.  POSITION VERIFIED BY AUSCULTATION.  OGT CLAMPED.  TEMP PROBE PLACED.  PT 
FEBRILE.  COOLING MEASURES STARTED.  TYLENOL GIVEN.

## 2018-01-08 NOTE — NUR
RN ICU

TYLENOL GIVEN FOR FEVER.  PT REMAINS ON COOLING BLANKET.  FIO2 TITRATED.  DIPRIVAN DRIP 
TITRATED.

## 2018-01-09 VITALS — DIASTOLIC BLOOD PRESSURE: 36 MMHG | SYSTOLIC BLOOD PRESSURE: 70 MMHG

## 2018-01-09 VITALS — SYSTOLIC BLOOD PRESSURE: 61 MMHG | DIASTOLIC BLOOD PRESSURE: 39 MMHG

## 2018-01-09 VITALS — DIASTOLIC BLOOD PRESSURE: 41 MMHG | SYSTOLIC BLOOD PRESSURE: 93 MMHG

## 2018-01-09 VITALS — DIASTOLIC BLOOD PRESSURE: 45 MMHG | SYSTOLIC BLOOD PRESSURE: 64 MMHG

## 2018-01-09 VITALS — SYSTOLIC BLOOD PRESSURE: 60 MMHG | DIASTOLIC BLOOD PRESSURE: 37 MMHG

## 2018-01-09 VITALS — DIASTOLIC BLOOD PRESSURE: 41 MMHG | SYSTOLIC BLOOD PRESSURE: 68 MMHG

## 2018-01-09 VITALS — DIASTOLIC BLOOD PRESSURE: 34 MMHG | SYSTOLIC BLOOD PRESSURE: 58 MMHG

## 2018-01-09 VITALS — SYSTOLIC BLOOD PRESSURE: 84 MMHG

## 2018-01-09 VITALS — DIASTOLIC BLOOD PRESSURE: 52 MMHG | SYSTOLIC BLOOD PRESSURE: 72 MMHG

## 2018-01-09 VITALS — DIASTOLIC BLOOD PRESSURE: 29 MMHG | SYSTOLIC BLOOD PRESSURE: 69 MMHG

## 2018-01-09 VITALS — SYSTOLIC BLOOD PRESSURE: 52 MMHG | DIASTOLIC BLOOD PRESSURE: 38 MMHG

## 2018-01-09 VITALS — DIASTOLIC BLOOD PRESSURE: 42 MMHG | SYSTOLIC BLOOD PRESSURE: 77 MMHG

## 2018-01-09 VITALS — SYSTOLIC BLOOD PRESSURE: 56 MMHG | DIASTOLIC BLOOD PRESSURE: 36 MMHG

## 2018-01-09 VITALS — DIASTOLIC BLOOD PRESSURE: 68 MMHG | SYSTOLIC BLOOD PRESSURE: 91 MMHG

## 2018-01-09 VITALS — DIASTOLIC BLOOD PRESSURE: 42 MMHG | SYSTOLIC BLOOD PRESSURE: 85 MMHG

## 2018-01-09 VITALS — SYSTOLIC BLOOD PRESSURE: 83 MMHG

## 2018-01-09 RX ADMIN — PROPOFOL PRN MLS/HR: 10 INJECTION, EMULSION INTRAVENOUS at 00:34

## 2018-01-09 RX ADMIN — PROPOFOL PRN MLS/HR: 10 INJECTION, EMULSION INTRAVENOUS at 00:35

## 2018-01-09 RX ADMIN — DEXTROSE MONOHYDRATE PRN MLS/HR: 50 INJECTION, SOLUTION INTRAVENOUS at 02:03

## 2018-01-09 NOTE — NUR
RN/ICU-PRONOUNCEMENT OF DEATH:

PATIENT CODE STATUS:DO NOT RESUSCITATE. PT. IS UNRESPONSIVE TO ANY FORM OF STIMULI. PUPILS 
ARE FIXED AND DILATED. EKG IS ASYSTOLE X2 LEADS. W/ ABSENT HEART TONES, PERIPHERAL PULSES 
ARE ABSENT. APNEIC. NO SIGNS OF LIFE. PT. PRONOUNCED AS  AT 0426.



BY DANISHA QUIÑONES RN

## 2018-01-09 NOTE — NUR
PT IS MAXED OUT ON LEVO, BENJAMIN AND VASOPRESSIN IN JUNCTIONAL RHYTHM, HR 60S, PT WENT WENT 
ASYSTOLE, PT IS DNR

## 2018-01-09 NOTE — NUR
DR MARTINEZ NOTIFIED ABOUT PT LOW BP AND CONDITION, VASOPRESSIN ORDERED, WILL START 
VASOPRESSIN DRIP

## 2018-01-09 NOTE — NUR
ICU RN. PT IS VERY UNSTABLE. [id number]/MIN, LEVO 40MCG/MIN,DIPRIVAN STOPPED.REPORT GIVEN TO 
RUY STEPHENS.

## 2018-01-09 NOTE — NUR
PT DAUGHTER CALLED AND NOTIFIED OF DEATH, WILL COME TO SEE BODY WITH PTS WIFE, NO MORTUARY 
PLANNED YET. [facility] NOTIFIED CASE NUMBER [id number], AWAITING CALL BACK FROM [facility] AND 
[facility]